# Patient Record
(demographics unavailable — no encounter records)

---

## 2019-12-05 NOTE — RAD
EXAM:

Chest PA and lateral:



HISTORY:

Cough. Congestion. Symptoms x2 weeks 



COMPARISON:

None



FINDINGS:



Heart: Normal cardiac silhouette

Aorta: Unremarkable

Pulmonary vessels: Normal

Costophrenic angles: Costophrenic angles are clear. 

Lungs: No consolidation or masses.

Pneumothorax: No pneumothorax

Osseous structures: No osseous abnormalities

IMPRESSION:

No acute cardiopulmonary process. 







Reported By: Selam Meade 

Electronically Signed:  12/5/2019 5:43 PM